# Patient Record
Sex: FEMALE | Race: WHITE | NOT HISPANIC OR LATINO | Employment: OTHER | URBAN - METROPOLITAN AREA
[De-identification: names, ages, dates, MRNs, and addresses within clinical notes are randomized per-mention and may not be internally consistent; named-entity substitution may affect disease eponyms.]

---

## 2019-09-09 PROBLEM — Z95.2 S/P AVR (AORTIC VALVE REPLACEMENT): Status: ACTIVE | Noted: 2019-09-09

## 2019-09-09 PROBLEM — C50.919 BREAST CANCER (HCC): Status: ACTIVE | Noted: 2019-09-09

## 2019-09-09 PROBLEM — Z95.2 S/P PULMONARY VALVE REPLACEMENT: Status: ACTIVE | Noted: 2019-09-09

## 2019-09-09 PROBLEM — Z87.74 STATUS POST PATCH CLOSURE OF ASD: Status: ACTIVE | Noted: 2019-09-09

## 2019-09-09 PROBLEM — Q24.9 CONGENITAL HEART DISEASE: Status: ACTIVE | Noted: 2019-09-09

## 2019-09-11 ENCOUNTER — OFFICE VISIT (OUTPATIENT)
Dept: CARDIOLOGY | Facility: CLINIC | Age: 44
End: 2019-09-11

## 2019-09-11 VITALS
DIASTOLIC BLOOD PRESSURE: 62 MMHG | SYSTOLIC BLOOD PRESSURE: 92 MMHG | WEIGHT: 184 LBS | BODY MASS INDEX: 27.25 KG/M2 | HEIGHT: 69 IN | HEART RATE: 71 BPM

## 2019-09-11 DIAGNOSIS — Z98.890 H/O ASCENDING AORTA REPAIR: ICD-10-CM

## 2019-09-11 DIAGNOSIS — Z95.3 S/P PULMONARY VALVE REPLACEMENT WITH BIOPROSTHETIC VALVE: ICD-10-CM

## 2019-09-11 DIAGNOSIS — Z87.74 STATUS POST PATCH CLOSURE OF ASD: ICD-10-CM

## 2019-09-11 DIAGNOSIS — Z95.2 S/P AVR (AORTIC VALVE REPLACEMENT): ICD-10-CM

## 2019-09-11 DIAGNOSIS — Z95.2 S/P AVR: Primary | ICD-10-CM

## 2019-09-11 DIAGNOSIS — I71.21 ANEURYSM OF ASCENDING AORTA (HCC): ICD-10-CM

## 2019-09-11 DIAGNOSIS — Z95.2 S/P PULMONARY VALVE REPLACEMENT: ICD-10-CM

## 2019-09-11 PROCEDURE — 99213 OFFICE O/P EST LOW 20 MIN: CPT | Performed by: INTERNAL MEDICINE

## 2019-09-11 RX ORDER — WARFARIN SODIUM 5 MG/1
1 TABLET ORAL DAILY
COMMUNITY
Start: 2019-09-04

## 2019-09-11 RX ORDER — FLUOXETINE HYDROCHLORIDE 20 MG/1
1 CAPSULE ORAL DAILY
COMMUNITY
Start: 2019-09-04 | End: 2021-06-28 | Stop reason: ALTCHOICE

## 2019-09-11 NOTE — PROGRESS NOTES
Subjective:     Encounter Date:09/11/2019      Patient ID: Jalyn Ravi is a 43 y.o. female.    Chief Complaint:  Chief Complaint   Patient presents with   • Cardiac Valve Problem       HPI:  I had the pleasure of seeing Jalyn in the office today.  She is a 43-year-old female with a history of congenital heart disease.  She underwent pulmonary valvotomy at age 1.  She then had aortic valve replacement with a mechanical prosthesis and pulmonic valve replacement with a mechanical prosthesis in 1997.  She also had an atrial septal defect closure.  In 2011, she developed pulmonic stenosis and underwent redo mechanical aortic valve with an ascending aortic graft (secondary to significant aortic dilatation) with a #21 [employer] D[employer]'s mechanical aortic valve.  She also had a tissue pulmonic valve replaced with a #21 medical tissue valve.  She has a history of PSVT which has been well controlled.  She does have a history of metastatic breast cancer (local).  She has a history of anxiety.    Jalyn is currently going through some menopausal symptoms and has been experiencing hot flashes, mood swings and emotional upsets.  She is not complaining of chest discomfort or shortness of air.  She states her activity level is normal.  She does clean houses for a living and states that she is able to do this without difficulty.  She is not experiencing palpitations, dizziness or near syncope.  There is no lower extremity edema.  She is compliant with her warfarin.  Her INR goal is 2.5-3.5.      The following portions of the patient's history were reviewed and updated as appropriate: allergies, current medications, past family history, past medical history, past social history, past surgical history and problem list.    Problem List:  Patient Active Problem List   Diagnosis   • SVT (supraventricular tachycardia) (CMS/HCC)   • Aneurysm of ascending aorta (CMS/HCC)   • S/P AVR (aortic valve replacement)   • S/P pulmonary valve  replacement   • Status post patch closure of ASD   • Congenital heart disease   • Breast cancer (CMS/HCC)       Past Medical History:  Past Medical History:   Diagnosis Date   • Aneurysm of ascending aorta (CMS/HCC)    • SVT (supraventricular tachycardia) (CMS/HCC)    • Tachycardia    • Valvular disease        Past Surgical History:  Past Surgical History:   Procedure Laterality Date   • AORTA SURGERY      Ascending aortic replacement   • AORTIC VALVE REPAIR/REPLACEMENT      Mechanical    • ASD AND VSD REPAIR     • PULMONARY VALVE REPLACEMENT         Social History:  Social History     Socioeconomic History   • Marital status:      Spouse name: Not on file   • Number of children: Not on file   • Years of education: Not on file   • Highest education level: Not on file   Tobacco Use   • Smoking status: Never Smoker   • Smokeless tobacco: Never Used   Substance and Sexual Activity   • Alcohol use: Yes   • Drug use: Defer   • Sexual activity: Defer       Allergies:  Allergies   Allergen Reactions   • Contrast Dye Unknown (See Comments)     Unknown      • Shellfish-Derived Products Unknown (See Comments)     Gets premedicated to get IV contrast   • Latex Rash       Immunizations:    There is no immunization history on file for this patient.    ROS:  Review of Systems   Constitution: Negative for chills, decreased appetite, fever, malaise/fatigue, weight gain and weight loss.   HENT: Negative for congestion, hoarse voice, nosebleeds and sore throat.    Eyes: Negative for blurred vision, double vision and visual disturbance.   Cardiovascular: Positive for palpitations. Negative for chest pain, claudication, dyspnea on exertion, irregular heartbeat, leg swelling, near-syncope, orthopnea, paroxysmal nocturnal dyspnea and syncope.   Respiratory: Negative for cough, hemoptysis, shortness of breath, sleep disturbances due to breathing, snoring, sputum production and wheezing.    Endocrine: Negative for cold intolerance,  "heat intolerance, polydipsia and polyuria.   Hematologic/Lymphatic: Negative for adenopathy and bleeding problem. Does not bruise/bleed easily.   Skin: Negative for flushing, itching, nail changes and rash.   Musculoskeletal: Positive for arthritis and joint pain. Negative for back pain, muscle cramps, muscle weakness, myalgias and neck pain.   Gastrointestinal: Negative for bloating, abdominal pain, anorexia, change in bowel habit, constipation, diarrhea, heartburn, hematemesis, hematochezia, jaundice, melena, nausea and vomiting.   Genitourinary: Negative for dysuria, hematuria and nocturia.   Neurological: Negative for brief paralysis, disturbances in coordination, excessive daytime sleepiness, dizziness, headaches, light-headedness, loss of balance, numbness, paresthesias, seizures and vertigo.   Psychiatric/Behavioral: Positive for depression. Negative for altered mental status. The patient is nervous/anxious.    Allergic/Immunologic: Negative for environmental allergies and hives.          Objective:         BP 92/62   Pulse 71   Ht 175.3 cm (69\")   Wt 83.5 kg (184 lb)   BMI 27.17 kg/m²     Physical Exam   Constitutional: She is oriented to person, place, and time. She appears well-developed and well-nourished. No distress.   HENT:   Head: Normocephalic and atraumatic.   Mouth/Throat: Oropharynx is clear and moist.   Eyes: Conjunctivae and EOM are normal. Pupils are equal, round, and reactive to light. No scleral icterus.   Neck: Normal range of motion. Neck supple. No thyromegaly present.   Cardiovascular: Normal rate, regular rhythm, S1 normal, S2 normal and intact distal pulses.  No extrasystoles are present. PMI is not displaced. Exam reveals no gallop, no S3, no S4, no friction rub and no decreased pulses.   Murmur ( There is a 2/6 to 3/6 systolic murmur heard at the upper right sternal border and mid right sternal border.  No diastolic murmur was heard) heard.  Pulses:       Carotid pulses are 2+ on " the right side, and 2+ on the left side.       Femoral pulses are 2+ on the right side, and 2+ on the left side.       Dorsalis pedis pulses are 2+ on the right side, and 2+ on the left side.        Posterior tibial pulses are 2+ on the right side, and 2+ on the left side.   There are crisp aortic mechanical valve sounds.   Pulmonary/Chest: Effort normal and breath sounds normal. No respiratory distress. She has no wheezes. She has no rales.   Healed surgical scar   Abdominal: Soft. Bowel sounds are normal. She exhibits no distension and no mass. There is no tenderness. There is no rebound and no guarding.   Musculoskeletal: Normal range of motion. She exhibits no edema.   Lymphadenopathy:     She has no cervical adenopathy.   Neurological: She is alert and oriented to person, place, and time. Coordination normal.   Skin: Skin is warm and dry. No rash noted. She is not diaphoretic. No pallor.   Psychiatric: She has a normal mood and affect. Her behavior is normal.   Nursing note and vitals reviewed.      In-Office Procedure(s):  Procedures    ASCVD RIsk Score::  The 10-year ASCVD risk score (Richmond LANDON Jr., et al., 2013) is: 0.4%    Values used to calculate the score:      Age: 43 years      Sex: Female      Is Non- : No      Diabetic: No      Tobacco smoker: No      Systolic Blood Pressure: 92 mmHg      Is BP treated: No      HDL Cholesterol: 49 mg/dL      Total Cholesterol: 185 mg/dL    Recent Radiology:  Imaging Results (most recent)     None          Lab Review:   not applicable             Assessment:          Diagnosis Plan   1. S/P AVR  Adult Transthoracic Echo Complete W/ Cont if Necessary Per Protocol   2. S/P pulmonary valve replacement with bioprosthetic valve  Adult Transthoracic Echo Complete W/ Cont if Necessary Per Protocol   3. H/O ascending aorta repair  CT Angiogram Chest With Contrast   4. S/P pulmonary valve replacement     5. Aneurysm of ascending aorta (CMS/HCC)     6.  Status post patch closure of ASD     7. S/P AVR (aortic valve replacement)            Plan:   Overall Jalyn is doing fairly well from a cardiac standpoint.  She does need surveillance of her valves and a sending aorta.  I will schedule her to have an echocardiogram as well as a CTA of the chest.  She is on metoprolol for her PSVT, which is well controlled        Level of Care:                 Nuria Leone MD  09/11/19  .

## 2019-10-16 ENCOUNTER — APPOINTMENT (OUTPATIENT)
Dept: CARDIOLOGY | Facility: HOSPITAL | Age: 44
End: 2019-10-16

## 2019-10-28 ENCOUNTER — TELEPHONE (OUTPATIENT)
Dept: CARDIOLOGY | Facility: CLINIC | Age: 44
End: 2019-10-28

## 2019-10-28 NOTE — TELEPHONE ENCOUNTER
Called patient with CT and echo results. Ascending aortic aneurysm is stable at 3.9, no change. Echo showed normal EF & valves functioned normally. dmk

## 2021-06-16 ENCOUNTER — TELEPHONE (OUTPATIENT)
Dept: CARDIOLOGY | Facility: CLINIC | Age: 46
End: 2021-06-16

## 2021-06-16 NOTE — TELEPHONE ENCOUNTER
Npt referral NOT received, call pt once referral is received / pt schld 6/28/21     # [phone]

## 2021-06-28 ENCOUNTER — OFFICE VISIT (OUTPATIENT)
Dept: CARDIOLOGY | Facility: CLINIC | Age: 46
End: 2021-06-28

## 2021-06-28 VITALS
BODY MASS INDEX: 27.7 KG/M2 | HEART RATE: 68 BPM | SYSTOLIC BLOOD PRESSURE: 124 MMHG | HEIGHT: 69 IN | DIASTOLIC BLOOD PRESSURE: 72 MMHG | WEIGHT: 187 LBS

## 2021-06-28 DIAGNOSIS — I71.21 ANEURYSM OF ASCENDING AORTA (HCC): Primary | Chronic | ICD-10-CM

## 2021-06-28 DIAGNOSIS — R53.82 CHRONIC FATIGUE: ICD-10-CM

## 2021-06-28 DIAGNOSIS — Z95.2 S/P PULMONARY VALVE REPLACEMENT: Chronic | ICD-10-CM

## 2021-06-28 DIAGNOSIS — Z87.74 STATUS POST PATCH CLOSURE OF ASD: Chronic | ICD-10-CM

## 2021-06-28 DIAGNOSIS — M62.838 MUSCLE SPASM: ICD-10-CM

## 2021-06-28 DIAGNOSIS — Z95.2 S/P AVR (AORTIC VALVE REPLACEMENT): Chronic | ICD-10-CM

## 2021-06-28 PROCEDURE — 99204 OFFICE O/P NEW MOD 45 MIN: CPT | Performed by: INTERNAL MEDICINE

## 2021-06-28 RX ORDER — SERTRALINE HYDROCHLORIDE 100 MG/1
1 TABLET, FILM COATED ORAL DAILY
COMMUNITY
Start: 2021-04-02

## 2021-06-28 RX ORDER — CYCLOBENZAPRINE HCL 10 MG
10 TABLET ORAL NIGHTLY PRN
Qty: 20 TABLET | Refills: 1 | Status: SHIPPED | OUTPATIENT
Start: 2021-06-28 | End: 2022-06-28

## 2021-06-28 RX ORDER — WARFARIN SODIUM 7.5 MG/1
1 TABLET ORAL DAILY
COMMUNITY
Start: 2021-03-12

## 2021-06-28 NOTE — PROGRESS NOTES
"Chief Complaint  S/P AVR    Subjective    History of Present Illness      I had the pleasure of seeing Jalyn in the office today.  She is a 45-year-old female with a history of congenital heart disease.  She underwent pulmonary valvotomy at age 1.  She then had aortic valve replacement with a mechanical prosthesis and pulmonic valve replacement with a mechanical prosthesis in 1997.  She also had an atrial septal defect closure.  In 2011, she developed pulmonic stenosis and underwent redo mechanical aortic valve with an ascending aortic graft (secondary to significant aortic dilatation) with a #21 [employer]'s mechanical aortic valve.  She also had a tissue pulmonic valve replaced with a #21 medical tissue valve.  She has a history of PSVT which has been well controlled.  She does have a history of metastatic breast cancer (local).  She has a history of anxiety.    Jalyn is doing well from a cardiac standpoint.  She is not complaining of chest discomfort or shortness of air.  She remains active.  She is compliant with her medications.  No lower extremity edema, orthopnea or paroxysmal nocturnal dyspnea.  She has occasional palpitations but nothing has been sustained.  She states that she was cleaning a mirror over the last couple days and pulled a muscle in her left upper back.  She did go to immediate care and was given a steroid Dosepak and baclofen.  She states that the steroids caused insomnia and tachycardia.  The baclofen was not helpful.       Objective   Vital Signs:   /72   Pulse 68   Ht 175.3 cm (69\")   Wt 84.8 kg (187 lb)   BMI 27.62 kg/m²     Vitals and nursing note reviewed.   Constitutional:       General: Not in acute distress.     Appearance: Healthy appearance. Well-developed and not in distress. Not diaphoretic.   Eyes:      General: No scleral icterus.     Conjunctiva/sclera: Conjunctivae normal.      Pupils: Pupils are equal, round, and reactive to light.   HENT:      Head: " Normocephalic and atraumatic.   Neck:      Thyroid: No thyromegaly.      Vascular: JVD normal.      Lymphadenopathy: No cervical adenopathy.   Pulmonary:      Effort: Pulmonary effort is normal. No respiratory distress.      Breath sounds: Normal breath sounds. No wheezing. No rales.   Cardiovascular:      PMI at left midclavicular line. Normal rate. Regular rhythm. Normal S1. Normal S2.      Murmurs:  There is a 1/6 to 2/6 systolic murmur heard at the upper right and upper left sternal border.  There are crisp mechanical valve sounds in the aortic position      No gallop. No S3 and S4 gallop. No click. No rub.   Pulses:     Intact distal pulses. No decreased pulses.   Edema:     Peripheral edema absent.   Abdominal:      General: Bowel sounds are normal. There is no distension.      Palpations: Abdomen is soft. There is no abdominal mass.      Tenderness: There is no abdominal tenderness. There is no guarding or rebound.   Musculoskeletal: Normal range of motion.      Cervical back: Normal range of motion and neck supple.      Comments: There is tenderness in the left scapular area to touch Skin:     General: Skin is warm and dry.      Coloration: Skin is not pale.      Findings: No rash.   Neurological:      Mental Status: Alert, oriented to person, place, and time and oriented to person, place and time.      Coordination: Coordination normal.      Gait: Gait is intact.   Psychiatric:         Behavior: Behavior normal.         Result Review :          She is scheduled to have lab work obtained next month with her primary care physician         Assessment and Plan    1. S/P AVR (aortic valve replacement)  Stable.  Her last echocardiogram was in 2019.  We will repeat    2. S/P pulmonary valve replacement  Stable.  No symptoms of shortness of air.    3. Status post patch closure of ASD  Stable    4. Aneurysm of ascending aorta (CMS/HCC)  She has not had a CT scan in quite some time.  I am going to order a CT scan for  surveillance.      5. Muscle spasm  I have given her prescription for Flexeril for her muscle discomfort  - cyclobenzaprine (FLEXERIL) 10 MG tablet; Take 1 tablet by mouth At Night As Needed for Muscle Spasms.  Dispense: 20 tablet; Refill: 1    Overall Jalyn has been doing fairly well.  She does need a repeat echo for ongoing evaluation of her mechanical aortic valve and bioprosthetic tissue valve.  I will also order a CT scan of her chest for assessment of her ascending aorta.  She does describe symptoms of fatigue and poor sleep habits.  She is agreeable to a sleep study        Follow Up   Return in about 6 months (around 12/28/2021).  Patient was given instructions and counseling regarding her condition or for health maintenance advice. Please see specific information pulled into the AVS if appropriate.

## 2021-07-12 ENCOUNTER — APPOINTMENT (OUTPATIENT)
Dept: SLEEP MEDICINE | Facility: HOSPITAL | Age: 46
End: 2021-07-12

## 2022-06-28 ENCOUNTER — OFFICE VISIT (OUTPATIENT)
Dept: CARDIOLOGY | Facility: CLINIC | Age: 47
End: 2022-06-28

## 2022-06-28 VITALS
WEIGHT: 182 LBS | BODY MASS INDEX: 26.96 KG/M2 | HEIGHT: 69 IN | HEART RATE: 70 BPM | SYSTOLIC BLOOD PRESSURE: 122 MMHG | DIASTOLIC BLOOD PRESSURE: 82 MMHG

## 2022-06-28 DIAGNOSIS — Z95.2 S/P PULMONARY VALVE REPLACEMENT: Chronic | ICD-10-CM

## 2022-06-28 DIAGNOSIS — I71.21 ANEURYSM OF ASCENDING AORTA: Chronic | ICD-10-CM

## 2022-06-28 DIAGNOSIS — Z87.74 STATUS POST PATCH CLOSURE OF ASD: Chronic | ICD-10-CM

## 2022-06-28 DIAGNOSIS — R00.2 PALPITATIONS: Chronic | ICD-10-CM

## 2022-06-28 DIAGNOSIS — Z95.2 S/P AVR (AORTIC VALVE REPLACEMENT): Primary | Chronic | ICD-10-CM

## 2022-06-28 PROCEDURE — 99214 OFFICE O/P EST MOD 30 MIN: CPT | Performed by: INTERNAL MEDICINE

## 2022-06-28 RX ORDER — FLUTICASONE PROPIONATE 50 MCG
2 SPRAY, SUSPENSION (ML) NASAL DAILY
COMMUNITY
Start: 2022-01-12

## 2022-06-28 NOTE — PROGRESS NOTES
"Chief Complaint  Cardiac Valve Problem    Subjective    History of Present Illness      I had the pleasure of seeing Jalyn in the office today.  She is a 46-year-old female with a history of congenital heart disease.  She underwent pulmonary valvotomy at age 1.  She then had aortic valve replacement with a mechanical prosthesis and pulmonic valve replacement with a mechanical prosthesis in 1997.  She also had an atrial septal defect closure.  In 2011, she developed pulmonic stenosis and underwent redo mechanical aortic valve with an ascending aortic graft (secondary to significant aortic dilatation) with a #21 [employer]'s mechanical aortic valve.  She also had a tissue pulmonic valve replaced with a #21 medical tissue valve.  She has a history of PSVT which has been well controlled.  She does have a history of metastatic breast cancer (local).  She has a history of anxiety.    Jalyn is doing well.  No symptoms of chest discomfort or shortness of air.  She does remain active.  She does have some dizziness and palpitations.  She is not experienced syncope.  No significant lower extremity edema, orthopnea or paroxysmal nocturnal dyspnea.  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   Constitutional:       General: Not in acute distress.     Appearance: Healthy appearance. Well-developed and not in distress. Not diaphoretic.   Eyes:      General: No scleral icterus.     Conjunctiva/sclera: Conjunctivae normal.      Pupils: Pupils are equal, round, and reactive to light.   HENT:      Head: Normocephalic and atraumatic.   Neck:      Thyroid: No thyromegaly.      Vascular: JVD normal.      Lymphadenopathy: No cervical adenopathy.   Pulmonary:      Effort: Pulmonary effort is normal. No respiratory " distress.      Breath sounds: Normal breath sounds. No wheezing. No rales.   Cardiovascular:      PMI at left midclavicular line. Normal rate. Regular rhythm. Normal S1. Normal S2.      Murmurs:  There is a 1/6 to 2/6 systolic murmur heard at the upper right and upper left sternal border.  There are crisp mechanical valve sounds in the aortic position      No gallop. No S3 and S4 gallop. No click. No rub.   Pulses:     Intact distal pulses. No decreased pulses.   Edema:     Peripheral edema absent.   Abdominal:      General: Bowel sounds are normal. There is no distension.      Palpations: Abdomen is soft. There is no abdominal mass.      Tenderness: There is no abdominal tenderness. There is no guarding or rebound.   Musculoskeletal: Normal range of motion.      Cervical back: Normal range of motion and neck supple.      Comments: There is tenderness in the left scapular area to touch Skin:     General: Skin is warm and dry.      Coloration: Skin is not pale.      Findings: No rash.   Neurological:      Mental Status: Alert, oriented to person, place, and time and oriented to person, place and time.      Coordination: Coordination normal.      Gait: Gait is intact.   Psychiatric:         Behavior: Behavior normal.         Result Review :        Component   Ref Range & Units 5 mo ago Resulting Agency   Triglycerides   0 - 149 mg/dL 167 High   [facility]   Comment: (note)   Triglyceride levels (in mg/dL):   Normal (0-9yrs: <75, 10-19yrs: <90, >19yrs: <150),   Borderline (0-9yrs: 75-99, 10-19yrs: , >19yrs: 150-199),   High/very high (0-9yrs: >99, 10-19yrs: >129, >19yrs: >200)   Total Cholesterol   0 - 199 mg/dL 209 High   [facility]   Comment: (note)   Cholesterol levels (in mg/dL):   Desirable <200 adults/<170 children (Desirable),   Borderline-high: 200-239 adults/170-199 children,   High >239 adults/>199 children.   HDL Cholesterol   60 - 9999 mg/dL 47 Low   [place]  [facility]   LDL Cholesterol    0 - 100 mg/dL 128 High   [facility]   Comment: (note)   LDL levels (in mg/dL):   Optimal <100,   Near optimal/above optimal 100-129,   Borderline High 130-159,   High 160-189, or   Very High >189   VLDL Cholesterol   0 - 30 mg/dL 33 High   [facility]   Chol/HDL Ratio   RATIO 4.4  [facility]   Is patient fasting?   (arb'U) Unknown  Fern Noatak MA   Specimen Collected: 01/18/22 15:20 Last Resulted: 01/18/22 19:36   Received From: [facility]      Component   Ref Range & Units 5 mo ago 2 yr ago 3 yr ago 8 yr ago   Sodium   136 - 145 mmol/L 141  138 R  137 R  137    Potassium   3.5 - 5.1 mmol/L 4.8  4.9 R  4.7 R  4.2 R    Chloride   98 - 107 mmol/L 102  99  101  98    Total CO2   22 - 29 mmol/L 30 High   29 R  29 R  32 High     Glucose   74 - 99 mg/dL 101 High   94  83  110 High  R    BUN   7 - 19 mg/dL 15  19 R  16 R  12 R    Creatinine   0.55 - 1.02 mg/dL 0.83  0.7 R  0.7 R  0.74 R    BUN/Creatinine Ratio   10.0 - 20.0 RATIO 18.6  26.0 High   25.0 High      Est GFR by Clearance   >60 /1.73 m2 >60  >60 CM  90 CM     Comment: (note)   Results do not take into account body mass.  Valid for patients 18   to 70 years of age.   Total Protein   6.4 - 8.2 g/dL 8.0  8.0 R  7.7 R     Albumin   3.5 - 5.2 g/dL 4.9  4.7 R  4.6 R     Globulin   1.5 - 4.5 g/dL 3.1  3.3  3.1     A/G Ratio   1.1 - 2.5 RATIO 1.6  1.4  1.5     Calcium   8.4 - 10.2 mg/dL 9.6  9.5  9.4  9.1 R    Total Bilirubin   0.2 - 1.2 mg/dL 0.4  0.4 R  0.4 R     AST (SGOT)   10 - 35 U/L 33  22 R  27 R     ALT (SGPT)   10 - 35 U/L 48 High   18 R  22 R     Alkaline Phosphatase   40 - 150 U/L 105  99 R  95 R       Component   Ref Range & Units 5 mo ago 2 yr ago 3 yr ago   TSH   0.27 - 4.20 u(iU)/mL 1.770  2.390  2.470                    Assessment and Plan    1. S/P AVR (aortic valve replacement)  1997 mechanical aortic valve replacement  July 2011:  Reoperative aortic valve conduit with repair of ascending aorta.  Aortic valve replacement with #21 [employer] D[employer]ion's mechanical aortic valve graft      2. S/P pulmonary valve replacement  Pulmonary valvotomy at age 1  1997: Pulmonic valve replacement with mechanical valve.  2011: Replacement of pulmonic valve with 21 [employer]. Darrion's medical tissue valve    3. Status post patch closure of ASD  1997    4. Aneurysm of ascending aorta (HCC)  Repair in 2011    5. Palpitations  She is having some palpitations which she describes as a fluttering sensation.  These are in association with brief dizziness.  We will obtain a monitor.    Jalyn needs to have an echocardiogram for assessment of her valvular status.  She also should have a CT scan of her chest for follow-up of her ascending aorta.  She needs an event monitor placed.  [facility] is out of network for her insurance.  She will have the studies performed at [facility]        Follow Up   No follow-ups on file.  Patient was given instructions and counseling regarding her condition or for health maintenance advice. Please see specific information pulled into the AVS if appropriate.

## 2022-09-16 ENCOUNTER — TELEPHONE (OUTPATIENT)
Dept: CARDIOLOGY | Facility: CLINIC | Age: 47
End: 2022-09-16

## 2022-09-16 NOTE — TELEPHONE ENCOUNTER
CT Chest and Echo ordered by you were done at [facility] on 9/7/2022. Please review in Care Everywhere.

## 2022-09-18 NOTE — TELEPHONE ENCOUNTER
Please let her know that her echocardiogram demonstrated a normal heart muscle.  The gradients across the pulmonic and aortic valve were mildly elevated.  Nothing to worry about but will need to follow every couple years.  The CT scan demonstrated a mild dilatation of the distal ascending thoracic aorta.  Nothing that she have to operate on, just monitor with periodic CT scans